# Patient Record
Sex: FEMALE | Race: WHITE | NOT HISPANIC OR LATINO | Employment: FULL TIME | URBAN - METROPOLITAN AREA
[De-identification: names, ages, dates, MRNs, and addresses within clinical notes are randomized per-mention and may not be internally consistent; named-entity substitution may affect disease eponyms.]

---

## 2021-11-02 ENCOUNTER — OFFICE VISIT (OUTPATIENT)
Dept: URGENT CARE | Facility: CLINIC | Age: 36
End: 2021-11-02
Payer: COMMERCIAL

## 2021-11-02 VITALS
TEMPERATURE: 98 F | SYSTOLIC BLOOD PRESSURE: 129 MMHG | HEART RATE: 83 BPM | DIASTOLIC BLOOD PRESSURE: 88 MMHG | OXYGEN SATURATION: 96 % | RESPIRATION RATE: 17 BRPM

## 2021-11-02 DIAGNOSIS — H92.01 OTALGIA OF RIGHT EAR: ICD-10-CM

## 2021-11-02 DIAGNOSIS — H66.91 ACUTE OTITIS MEDIA, RIGHT: Primary | ICD-10-CM

## 2021-11-02 PROCEDURE — 99203 OFFICE O/P NEW LOW 30 MIN: CPT | Mod: S$GLB,,, | Performed by: STUDENT IN AN ORGANIZED HEALTH CARE EDUCATION/TRAINING PROGRAM

## 2021-11-02 PROCEDURE — 99203 PR OFFICE/OUTPT VISIT, NEW, LEVL III, 30-44 MIN: ICD-10-PCS | Mod: S$GLB,,, | Performed by: STUDENT IN AN ORGANIZED HEALTH CARE EDUCATION/TRAINING PROGRAM

## 2021-11-02 RX ORDER — DOXYCYCLINE HYCLATE 100 MG
100 TABLET ORAL 2 TIMES DAILY
Qty: 20 TABLET | Refills: 0 | Status: SHIPPED | OUTPATIENT
Start: 2021-11-02 | End: 2021-11-12

## 2023-05-17 NOTE — PROGRESS NOTES
[facility] HEM/ONC CONSULTATION      Subjective:       Patient ID: Violet Salmeron is a 37 y.o. female.    Chief Complaint: PE    Patient presented to [facility] on 3/19/2023 with a report of injuring her right leg; pain from the knee down.  She was found on imaging to have Oblique fracture of distal Fibula; Depressed fracture of the lateral tibial Plateau; She had an ORIF of the proxima Tibial and ankle on 3/19/2023 by Dr. Graf.    On 4/17/2023 she was directed to the ER by her PCP for SOB and mild cough. On CTA she was found to have an extensive PE and right lower ext DVT. On 4/18/2023 She was transferred to [facility] for management of heparin drip. She was initiated on Eliquis BID prior to discharge.    She is here  today for evaluation prior to starting physical therapy. She states SOB has resolved and she has a intermittent cough but overall right lower leg calf pain has resolved as well.    Mom passed away at 19 from ovarian cancer. Father still alive and well. No known family history of clots.    Patient denies any birth control use; Tubal Ligation 2009      History reviewed. No pertinent past medical history.    Past Surgical History:   Procedure Laterality Date    CHOLECYSTECTOMY  2009    RECONSTRUCTION OF ANKLE  03/19/2023    right knee surgery  03/19/2023    TONSILLECTOMY AND ADENOIDECTOMY Bilateral 1989    TUBAL LIGATION  2009       Social History     Socioeconomic History    Marital status: Unknown       History reviewed. No pertinent family history.    Review of patient's allergies indicates:   Allergen Reactions    Penicillins Hives    Rocephin [ceftriaxone]        Current Outpatient Medications:     apixaban (ELIQUIS) 5 mg Tab, Take 1 tablet (5 mg total) by mouth 2 (two) times daily., Disp: 60 tablet, Rfl: 5    All medications and past history have been reviewed.    Review of Systems   Constitutional:  Positive for activity change and fatigue. Negative for fever and unexpected weight  change.   HENT:  Negative for postnasal drip and sore throat.    Eyes:  Negative for visual disturbance.   Respiratory:  Negative for cough and shortness of breath.    Cardiovascular:  Positive for leg swelling. Negative for chest pain.   Gastrointestinal:  Negative for abdominal pain, blood in stool, constipation, diarrhea, nausea and vomiting.   Genitourinary:  Negative for dysuria and hematuria.   Musculoskeletal:  Negative for neck stiffness.   Skin:  Negative for color change and rash.   Neurological:  Negative for headaches.   Hematological:  Does not bruise/bleed easily.     Objective:        /66   Pulse 101   Temp 99.2 °F (37.3 °C)     Physical Exam  Constitutional:       Appearance: She is obese.   HENT:      Head: Normocephalic and atraumatic.      Right Ear: External ear normal.      Left Ear: External ear normal.      Nose: Nose normal.      Mouth/Throat:      Mouth: Mucous membranes are moist.   Eyes:      Pupils: Pupils are equal, round, and reactive to light.   Cardiovascular:      Rate and Rhythm: Normal rate and regular rhythm.      Heart sounds: Normal heart sounds.   Pulmonary:      Breath sounds: Normal breath sounds.   Abdominal:      Palpations: Abdomen is soft.   Musculoskeletal:      Right lower leg: Edema present.      Left lower leg: No edema.      Comments: Wheelchair   Skin:     General: Skin is warm and dry.   Neurological:      General: No focal deficit present.      Mental Status: She is alert and oriented to person, place, and time.   Psychiatric:         Mood and Affect: Mood normal.         Behavior: Behavior normal.         Thought Content: Thought content normal.         Judgment: Judgment normal.         Lab  No results found for this or any previous visit (from the past 336 hour(s)).  CMP  No results found for: NA, K, CL, CO2, GLU, BUN, CREATININE, CALCIUM, PROT, ALBUMIN, BILITOT, ALKPHOS, AST, ALT, ANIONGAP, ESTGFRAFRICA, EGFRNONAA      Specimen (24h ago, onward)       None            All lab results and imaging results have been reviewed and discussed with the patient.     Assessment:       1. Pulmonary embolism, unspecified chronicity, unspecified pulmonary embolism type, unspecified whether acute cor pulmonale present    2. DVT, lower extremity, distal, acute, right    3. Closed fracture of right ankle, sequela      Problem List Items Addressed This Visit    None  Visit Diagnoses       Pulmonary embolism, unspecified chronicity, unspecified pulmonary embolism type, unspecified whether acute cor pulmonale present    -  Primary    Relevant Medications    apixaban (ELIQUIS) 5 mg Tab    Other Relevant Orders    CBC Auto Differential    CMP    Cardiolipin antibody    PROTEIN C    PROTEIN S    Antithrombin III    CTA Chest Non-Coronary (PE Studies)    DVT, lower extremity, distal, acute, right        Relevant Medications    apixaban (ELIQUIS) 5 mg Tab    Other Relevant Orders    CBC Auto Differential    CMP    Cardiolipin antibody    PROTEIN C    PROTEIN S    Antithrombin III    CTA Chest Non-Coronary (PE Studies)    Closed fracture of right ankle, sequela               Cancer Staging   No matching staging information was found for the patient.        Right Knee Fx- OK to proceed with Physical therapy  2. Right ankle Fx- Ok to proceed with physical therapy  3. Right leg DVT-  labs today; [facility]  4. PE- Continue Eliquis; Ok to proceed with physical therapy once cleared by ortho    Plan:         Follow up in about 2 months (around 7/19/2023) for with Dr. Mchugh.       [facility] [place]    The plan was discussed with the patient and all questions/concerns have been answered to the patient's satisfaction.      Electronically signed by: Amanda Mccray, MSN, APRN, AGNP-C, OCN

## 2023-05-19 ENCOUNTER — OFFICE VISIT (OUTPATIENT)
Dept: HEMATOLOGY/ONCOLOGY | Facility: CLINIC | Age: 38
End: 2023-05-19
Payer: COMMERCIAL

## 2023-05-19 VITALS — DIASTOLIC BLOOD PRESSURE: 66 MMHG | HEART RATE: 101 BPM | TEMPERATURE: 99 F | SYSTOLIC BLOOD PRESSURE: 139 MMHG

## 2023-05-19 DIAGNOSIS — I82.4Z1 DVT, LOWER EXTREMITY, DISTAL, ACUTE, RIGHT: ICD-10-CM

## 2023-05-19 DIAGNOSIS — S82.891S CLOSED FRACTURE OF RIGHT ANKLE, SEQUELA: ICD-10-CM

## 2023-05-19 DIAGNOSIS — I26.99 PULMONARY EMBOLISM, UNSPECIFIED CHRONICITY, UNSPECIFIED PULMONARY EMBOLISM TYPE, UNSPECIFIED WHETHER ACUTE COR PULMONALE PRESENT: Primary | ICD-10-CM

## 2023-05-19 PROCEDURE — 3075F SYST BP GE 130 - 139MM HG: CPT | Mod: CPTII,S$GLB,, | Performed by: NURSE PRACTITIONER

## 2023-05-19 PROCEDURE — 3078F PR MOST RECENT DIASTOLIC BLOOD PRESSURE < 80 MM HG: ICD-10-PCS | Mod: CPTII,S$GLB,, | Performed by: NURSE PRACTITIONER

## 2023-05-19 PROCEDURE — 99204 PR OFFICE/OUTPT VISIT, NEW, LEVL IV, 45-59 MIN: ICD-10-PCS | Mod: S$GLB,,, | Performed by: NURSE PRACTITIONER

## 2023-05-19 PROCEDURE — 1159F MED LIST DOCD IN RCRD: CPT | Mod: CPTII,S$GLB,, | Performed by: NURSE PRACTITIONER

## 2023-05-19 PROCEDURE — 1160F PR REVIEW ALL MEDS BY PRESCRIBER/CLIN PHARMACIST DOCUMENTED: ICD-10-PCS | Mod: CPTII,S$GLB,, | Performed by: NURSE PRACTITIONER

## 2023-05-19 PROCEDURE — 1160F RVW MEDS BY RX/DR IN RCRD: CPT | Mod: CPTII,S$GLB,, | Performed by: NURSE PRACTITIONER

## 2023-05-19 PROCEDURE — 99204 OFFICE O/P NEW MOD 45 MIN: CPT | Mod: S$GLB,,, | Performed by: NURSE PRACTITIONER

## 2023-05-19 PROCEDURE — 1159F PR MEDICATION LIST DOCUMENTED IN MEDICAL RECORD: ICD-10-PCS | Mod: CPTII,S$GLB,, | Performed by: NURSE PRACTITIONER

## 2023-05-19 PROCEDURE — 3075F PR MOST RECENT SYSTOLIC BLOOD PRESS GE 130-139MM HG: ICD-10-PCS | Mod: CPTII,S$GLB,, | Performed by: NURSE PRACTITIONER

## 2023-05-19 PROCEDURE — 3078F DIAST BP <80 MM HG: CPT | Mod: CPTII,S$GLB,, | Performed by: NURSE PRACTITIONER

## 2023-06-23 ENCOUNTER — TELEPHONE (OUTPATIENT)
Dept: HEMATOLOGY/ONCOLOGY | Facility: CLINIC | Age: 38
End: 2023-06-23

## 2023-06-23 NOTE — TELEPHONE ENCOUNTER
----- Message from Radha Ruiz sent at 6/23/2023  1:03 PM CDT -----  Pt would like CT and lab orders sent to [facility] [phone]

## 2023-07-18 ENCOUNTER — TELEPHONE (OUTPATIENT)
Dept: HEMATOLOGY/ONCOLOGY | Facility: CLINIC | Age: 38
End: 2023-07-18

## 2023-07-18 NOTE — TELEPHONE ENCOUNTER
Spoke to pt and explained to her that all labs came back negative so far and that she needs to keep her follow up with Dr. Payne. Per Amanda, she also still needs to have her CT done on the 24th to make sure that the clot has resolved. Pt verbalized understanding.

## 2023-07-18 NOTE — TELEPHONE ENCOUNTER
----- Message from PRANEETH Rubio sent at 7/18/2023  3:13 PM CDT -----  Yes she needs to do the follow up to make sure the clot has resolved.  ----- Message -----  From: Juana Barry RN  Sent: 7/18/2023   1:14 PM CDT  To: PRANEETH Rubio    Hey she wants to know if she should still do her CT? She has one scheduled on the 24th  ----- Message -----  From: PRANEETH Rubio  Sent: 7/17/2023   4:55 PM CDT  To: Juana Barry RN    She needs to keep her follow up with Dr. Mchugh 7/31. She doesn't need any further labs. So far everything is negative.      Amanda  ----- Message -----  From: Juana Barry RN  Sent: 7/17/2023   1:27 PM CDT  To: PRANEETH Rubio      ----- Message -----  From: Sierra Eugene  Sent: 7/17/2023   1:20 PM CDT  To: Mahendra Patel Staff    The patient would like a call back to discuss the results of her labs from 7-3. CB# [phone] She also asked when she needs to repeat the labs.

## 2023-07-31 ENCOUNTER — TELEPHONE (OUTPATIENT)
Dept: HEMATOLOGY/ONCOLOGY | Facility: CLINIC | Age: 38
End: 2023-07-31

## 2023-07-31 ENCOUNTER — OFFICE VISIT (OUTPATIENT)
Dept: HEMATOLOGY/ONCOLOGY | Facility: CLINIC | Age: 38
End: 2023-07-31
Payer: COMMERCIAL

## 2023-07-31 VITALS
BODY MASS INDEX: 47.09 KG/M2 | WEIGHT: 293 LBS | SYSTOLIC BLOOD PRESSURE: 130 MMHG | DIASTOLIC BLOOD PRESSURE: 77 MMHG | HEIGHT: 66 IN | TEMPERATURE: 98 F | RESPIRATION RATE: 18 BRPM | HEART RATE: 97 BPM

## 2023-07-31 DIAGNOSIS — I82.4Z1 DVT, LOWER EXTREMITY, DISTAL, ACUTE, RIGHT: ICD-10-CM

## 2023-07-31 DIAGNOSIS — S82.891S CLOSED FRACTURE OF RIGHT ANKLE, SEQUELA: ICD-10-CM

## 2023-07-31 DIAGNOSIS — I26.99 PULMONARY EMBOLISM, UNSPECIFIED CHRONICITY, UNSPECIFIED PULMONARY EMBOLISM TYPE, UNSPECIFIED WHETHER ACUTE COR PULMONALE PRESENT: Primary | ICD-10-CM

## 2023-07-31 DIAGNOSIS — D50.0 IRON DEFICIENCY ANEMIA DUE TO CHRONIC BLOOD LOSS: ICD-10-CM

## 2023-07-31 PROCEDURE — 3075F PR MOST RECENT SYSTOLIC BLOOD PRESS GE 130-139MM HG: ICD-10-PCS | Mod: CPTII,S$GLB,, | Performed by: INTERNAL MEDICINE

## 2023-07-31 PROCEDURE — 3075F SYST BP GE 130 - 139MM HG: CPT | Mod: CPTII,S$GLB,, | Performed by: INTERNAL MEDICINE

## 2023-07-31 PROCEDURE — 3008F PR BODY MASS INDEX (BMI) DOCUMENTED: ICD-10-PCS | Mod: CPTII,S$GLB,, | Performed by: INTERNAL MEDICINE

## 2023-07-31 PROCEDURE — 99214 OFFICE O/P EST MOD 30 MIN: CPT | Mod: S$GLB,,, | Performed by: INTERNAL MEDICINE

## 2023-07-31 PROCEDURE — 3078F DIAST BP <80 MM HG: CPT | Mod: CPTII,S$GLB,, | Performed by: INTERNAL MEDICINE

## 2023-07-31 PROCEDURE — 3078F PR MOST RECENT DIASTOLIC BLOOD PRESSURE < 80 MM HG: ICD-10-PCS | Mod: CPTII,S$GLB,, | Performed by: INTERNAL MEDICINE

## 2023-07-31 PROCEDURE — 3008F BODY MASS INDEX DOCD: CPT | Mod: CPTII,S$GLB,, | Performed by: INTERNAL MEDICINE

## 2023-07-31 PROCEDURE — 1159F MED LIST DOCD IN RCRD: CPT | Mod: CPTII,S$GLB,, | Performed by: INTERNAL MEDICINE

## 2023-07-31 PROCEDURE — 99214 PR OFFICE/OUTPT VISIT, EST, LEVL IV, 30-39 MIN: ICD-10-PCS | Mod: S$GLB,,, | Performed by: INTERNAL MEDICINE

## 2023-07-31 PROCEDURE — 1159F PR MEDICATION LIST DOCUMENTED IN MEDICAL RECORD: ICD-10-PCS | Mod: CPTII,S$GLB,, | Performed by: INTERNAL MEDICINE

## 2023-07-31 NOTE — LETTER
July 31, 2023        Arleth Singh, [facility]  [address]  [place] [address]             [facility] - Hematology Oncology  [address] [facility] [address]  Phone: [phone]  Fax: [phone]   Patient: Violet Salmeron   MR Number: [id number]   YOB: 1985   Date of Visit: 7/31/2023       Dear Dr. Singh:    Thank you for referring Violet Salmeron to me for evaluation. Below are the relevant portions of my assessment and plan of care.            If you have questions, please do not hesitate to call me. I look forward to following Violet along with you.    Sincerely,      ADELE Payne MD           CC    No Recipients

## 2023-08-01 NOTE — PROGRESS NOTES
[facility] hematology Oncology subsequent encounter note    2023      Subjective:      Patient ID:   Violet Salmeron  37 y.o. female  1985  Arleth Singh NP      Chief Complaint:   PE, DVT    HPI:  37 y.o. female slipped and fell, landing on her right leg and sustaining fractures with injury to her ankle and knee areas.  She required surgical repair in 2023.  Course was complicated by postoperative pneumonia.  Approximately 1 month after her injury she developed right calf pain and was found to have extensive pulmonary emboli and right leg DVT.  She was treated with heparin initially and subsequently converted over to Eliquis.  She is on Eliquis 5 mg p.o. b.i.d..  She has completed physical therapy.  She denies shortness of breath and leg pain at this time.    She has heavy menses lasting up to 7 days, menses are monthly.  Menses have become heavy since starting Eliquis b.i.d..  Hemoglobin is 11.9 and MCV is 78 supporting iron deficiency.  Discussed with her oral iron supplement versus IV iron replenishment.  She agrees to injected for weekly x2 weeks through [facility] to replenish iron stores.   She admits to increasing fatigue and has a stamina level of 3/10.  Also admits to hair loss symptoms.    She had onset of menses at age 13 and delivered her 1st live child at the maternal age of 22.  She had vaginal deliveries, she is a  3 para 3 miscarriage 0 individual.  She has not had breast biopsy and she does not have family history of breast cancer.      He is allergic to penicillin and Rocephin.  She does not smoke, she does not drink alcohol with regularity, she is a nurse.    Medication is  Eliquis 5 mg p.o. b.i.d..  She recently was found to have a vitamin-D level of 9 and has been started on vitamin-D 38972 units weekly.    Her mother had ovarian cancer, her daughter had ovarian cancer, another daughter had complete hysterectomy and liver transplant after liver injury from  "Zithromax.  She has 2 sons and a daughter alive and well.  Paternal aunt and paternal grandmother had history of DVT and there is MTHFR mutation in the family            ROS:   GEN: normal without any fever, night sweats or weight loss  HEENT: normal with no HA's, sore throat, stiff neck, changes in vision  CV: normal with no CP, SOB, PND, GUTIERREZ or orthopnea  PULM: normal with no SOB, cough, hemoptysis, sputum or pleuritic pain  GI: normal with no abdominal pain, nausea, vomiting, constipation, diarrhea, melanotic stools, BRBPR, or hematemesis  : normal with no hematuria, dysuria  BREAST: normal with no mass, discharge, pain  SKIN: normal with no rash, erythema, bruising, or swelling     No past medical history on file.  Past Surgical History:   Procedure Laterality Date    CHOLECYSTECTOMY  2009    RECONSTRUCTION OF ANKLE  03/19/2023    right knee surgery  03/19/2023    TONSILLECTOMY AND ADENOIDECTOMY Bilateral 1989    TUBAL LIGATION  2009       Review of patient's allergies indicates:   Allergen Reactions    Penicillins Hives    Rocephin [ceftriaxone]      Social History     Socioeconomic History    Marital status: Unknown   Tobacco Use    Smoking status: Never    Smokeless tobacco: Never         Current Outpatient Medications:     apixaban (ELIQUIS) 5 mg Tab, Take 1 tablet (5 mg total) by mouth 2 (two) times daily., Disp: 60 tablet, Rfl: 5          Objective:   Vitals:  Blood pressure 130/77, pulse 97, temperature 98.1 °F (36.7 °C), resp. rate 18, height 5' 6" (1.676 m), weight (!) 142 kg (313 lb).    Physical Examination:   GEN: no apparent distress, comfortable  HEAD: atraumatic and normocephalic  EYES: no pallor, no icterus  ENT: no pharyngeal erythema, external ears WNL; no nasal discharge  NECK: no masses, thyroid normal, trachea midline, no LAD/LN's, supple  CV: RRR with no murmur; normal pulse; normal S1 and S2; no pedal edema  CHEST: Normal respiratory effort; CTAB; normal breath sounds; no wheeze or " crackles  ABDOM: nontender and nondistended; soft; no rebound/guarding, L/S NP  MUSC/Skeletal: ROM normal; no crepitus; joints normal  EXTREM: no clubbing, cyanosis, inflammation or swelling  SKIN: no rashes, lesions, ulcers, petechiae   : no cvat  NEURO: grossly intact; motor/sensory WNL;  no tremors  PSYCH: normal mood, affect and behavior  LYMPH: normal cervical, supraclavicular,  LN's      Sodium   Date Value Ref Range Status   04/19/2023 138 136 - 145 mmol/L Final     Potassium   Date Value Ref Range Status   04/19/2023 4.4 3.5 - 5.1 mmol/L Final     Chloride   Date Value Ref Range Status   04/19/2023 104 100 - 109 mmol/L Final     Carbon Dioxide   Date Value Ref Range Status   04/19/2023 20 (L) 22 - 33 mmol/L Final     Blood Urea Nitrogen   Date Value Ref Range Status   04/19/2023 11 5 - 25 mg/dL Final     Creatinine   Date Value Ref Range Status   04/19/2023 0.68 0.57 - 1.25 mg/dL Final     Calcium   Date Value Ref Range Status   04/19/2023 8.8 8.8 - 10.6 mg/dL Final     Albumin Level   Date Value Ref Range Status   04/19/2023 3.2 (L) 3.5 - 5.0 g/dl Final     Anion Gap   Date Value Ref Range Status   04/19/2023 14 8 - 16 mmol/L Final     eGFR    Date Value Ref Range Status   04/19/2023 115 mL/min/1.73mSq Final     Comment:     In accordance with NKF-ASN Task Force recommendation, calculation based on the Chronic Kidney Disease Epidemiology Collaboration (CKD-EPI) equation without adjustment for race. eGFR adjusted for gender and age and calculated in ml/min/1.73mSquared. eGFR cannot be calculated if patient is under 18 years of age.     Reference Range:   >= 60 ml/min/1.73mSquared.         Assessment:   (1) 37 y.o. female with history of fall with injury.  She had several fractures at the right ankle and knee injury requiring surgery at the right knee and ankle with reconstruction.    (2) approximately 1 month postop she developed pain in the right calf and was found to have DVT.  She also  was found to have extensive pulmonary emboli with shortness of breath symptoms.  Currently on Eliquis 5 mg p.o. b.i.d..    (3) at this time plan to continue her on Eliquis through around October 19, 2023.  At that time CT scan of the chest and ultrasound of the veins of the right leg will be done to check on the status or resolution of PE and DVT.  The studies will be done at [facility].    (4) after PE and DVT have resolved, will temporarily interrupt the Eliquis to attain hypercoagulation evaluation.  The hypercoagulation lab studies will be done in October 2023.  Once we review the results, we can make a decision on continuing a possibly stopping the Eliquis at that time.  She follows up with myself towards the end of October.    (5)Mother and sister had ovarian cancer, will get BrCa 1 & 2 testing if not already done on mother or sister.

## 2023-08-01 NOTE — TELEPHONE ENCOUNTER
Ask her if she can get the ferritin level at .  It will take 2-3 days to get the result.  After I check the level, I will order the Injectafer at  for her.

## 2023-12-23 ENCOUNTER — TELEPHONE (OUTPATIENT)
Dept: HEMATOLOGY/ONCOLOGY | Facility: CLINIC | Age: 38
End: 2023-12-23

## 2023-12-26 ENCOUNTER — TELEPHONE (OUTPATIENT)
Dept: HEMATOLOGY/ONCOLOGY | Facility: CLINIC | Age: 38
End: 2023-12-26